# Patient Record
(demographics unavailable — no encounter records)

---

## 2025-01-16 NOTE — PHYSICAL EXAM
[Alert] : alert [Oriented x 3] : ~L oriented x 3 [Well Nourished] : well nourished [Full Body Skin Exam Performed] : performed [FreeTextEntry3] :  alert,  oriented x 3, well nourished, conjunctiva non-injected, no visual lymphadenopathy, no clubbing, no edema, no bromhidrosis and no chromhidrosis.   Full body skin exam was performed.   General: Alert and oriented, in NAD. All of the following were examined and were within normal limits, except as noted: Scalp: Face, including eyelids, nose, lips, ears, oropharynx: Neck: Chest/Back/Abdomen: Bilateral Arms/Hands: Bilateral Legs/Feet: Buttocks, Genitalia, Anus/perineum:   Hair, Nails, Oral Mucosa, Eyes: - benign nevi trunk & extremities  - red papules trunk  - stuck on plaques trunk  - small 1-2mm white macules on legs

## 2025-01-16 NOTE — ASSESSMENT
[FreeTextEntry1] : #  #Seborrheic Keratosis - These growths are benign - Related to genetics - these lesions run in families; NOT related to sun exposure - No treatment warranted unless inflamed; can use OTC Sarna lotion PRN itch  #  Multiple melanocytic nevi, benign - Monitor moles for changes - Recommend wearing hats and sun protective clothing or OTC sunscreen products (SPF 30+, broad band, EltaMD/La Roche Posay/Neutrogena) daily on your face and entire body (apply sunscreen atleast 30 minutes prior to going outside). Reapply sunscreen every 2 hours when outside.  #  Angiomas (benign noncancerous blood vessel growths) - Clinically benign red or purple blood vessel growths - No treatment warranted  # idioapthic guttate hypomelanosis  -e education & reassurance provided   #  Screening exam for skin cancer- TBSE performed today - Advised sun protection. Recommended OTC sunscreen products (EltaMD/Neutrogena/La Roche Posay), including SPF30+ with broadband UV protection as well as proper use. Discussed OTC sun protective clothing - Counseled patient to monitor for changes, including mole monitoring and self-skin exams.  RTC 1 year or sooner PRN

## 2025-01-16 NOTE — HISTORY OF PRESENT ILLNESS
[FreeTextEntry1] : TBSE  [de-identified] : 51yo F here for:   #TBSE- no new or concerning spots    Derm Hx: denies Skin Cancer Hx: denies Family Hx: denies hx skin disease

## 2025-01-24 NOTE — PLAN
[FreeTextEntry1] : 52-year-old female for annual physical exam.  Labs as ordered.  Mammo and Pap per GYN.  Patient declines colon cancer screening at this time.  Will think about it.  All questions answered.  Patient voiced understanding and in agreement to plan.  Return to clinic as recommended.

## 2025-01-24 NOTE — HEALTH RISK ASSESSMENT
[Yes] : Yes [2 - 4 times a month (2 pts)] : 2-4 times a month (2 points) [1 or 2 (0 pts)] : 1 or 2 (0 points) [Never (0 pts)] : Never (0 points) [0] : 2) Feeling down, depressed, or hopeless: Not at all (0) [Never] : Never [Patient reported mammogram was normal] : Patient reported mammogram was normal [Patient reported PAP Smear was normal] : Patient reported PAP Smear was normal [Audit-CScore] : 2 [MammogramDate] : 08/2024 [PapSmearDate] : 07/2024

## 2025-01-24 NOTE — PHYSICAL EXAM
[No Acute Distress] : no acute distress [Well Nourished] : well nourished [Well Developed] : well developed [Well-Appearing] : well-appearing [Normal Voice/Communication] : normal voice/communication [EOMI] : extraocular movements intact [PERRL] : pupils equal round and reactive to light [Normal Oropharynx] : the oropharynx was normal [Supple] : supple [No Respiratory Distress] : no respiratory distress  [Clear to Auscultation] : lungs were clear to auscultation bilaterally [Normal Rate] : normal rate  [Normal S1, S2] : normal S1 and S2 [Soft] : abdomen soft [Non Tender] : non-tender [Non-distended] : non-distended [Normal Bowel Sounds] : normal bowel sounds [Speech Grossly Normal] : speech grossly normal [Normal Affect] : the affect was normal [Normal Mood] : the mood was normal